# Patient Record
Sex: MALE | Race: WHITE | NOT HISPANIC OR LATINO | Employment: OTHER | URBAN - METROPOLITAN AREA
[De-identification: names, ages, dates, MRNs, and addresses within clinical notes are randomized per-mention and may not be internally consistent; named-entity substitution may affect disease eponyms.]

---

## 2019-11-18 ENCOUNTER — OFFICE VISIT (OUTPATIENT)
Dept: GASTROENTEROLOGY | Facility: CLINIC | Age: 59
End: 2019-11-18
Payer: COMMERCIAL

## 2019-11-18 ENCOUNTER — TELEPHONE (OUTPATIENT)
Dept: GASTROENTEROLOGY | Facility: CLINIC | Age: 59
End: 2019-11-18

## 2019-11-18 VITALS — HEIGHT: 72 IN | BODY MASS INDEX: 26.69 KG/M2 | WEIGHT: 197.06 LBS | RESPIRATION RATE: 18 BRPM

## 2019-11-18 DIAGNOSIS — Z86.59 HISTORY OF ANXIETY: ICD-10-CM

## 2019-11-18 DIAGNOSIS — Z87.19 HISTORY OF BARRETT'S ESOPHAGUS: ICD-10-CM

## 2019-11-18 DIAGNOSIS — R19.7 INTERMITTENT DIARRHEA: ICD-10-CM

## 2019-11-18 DIAGNOSIS — E04.1 THYROID NODULE: ICD-10-CM

## 2019-11-18 DIAGNOSIS — K31.89 DUODENAL NODULE: ICD-10-CM

## 2019-11-18 DIAGNOSIS — Z80.0 FAMILY HISTORY OF COLON CANCER: ICD-10-CM

## 2019-11-18 DIAGNOSIS — R10.11 RUQ ABDOMINAL PAIN: ICD-10-CM

## 2019-11-18 DIAGNOSIS — Z86.19 HISTORY OF CRYPTOSPORIDIOSIS: ICD-10-CM

## 2019-11-18 DIAGNOSIS — K27.9 PEPTIC ULCER: ICD-10-CM

## 2019-11-18 DIAGNOSIS — R19.8 ABNORMAL FINDINGS ON ESOPHAGOGASTRODUODENOSCOPY (EGD): Primary | ICD-10-CM

## 2019-11-18 DIAGNOSIS — R13.10 DYSPHAGIA, UNSPECIFIED TYPE: ICD-10-CM

## 2019-11-18 DIAGNOSIS — Z86.010 HISTORY OF COLON POLYPS: ICD-10-CM

## 2019-11-18 PROCEDURE — 99203 OFFICE O/P NEW LOW 30 MIN: CPT | Mod: PBBFAC,PO | Performed by: NURSE PRACTITIONER

## 2019-11-18 PROCEDURE — 99999 PR PBB SHADOW E&M-NEW PATIENT-LVL III: ICD-10-PCS | Mod: PBBFAC,,, | Performed by: NURSE PRACTITIONER

## 2019-11-18 PROCEDURE — 99999 PR PBB SHADOW E&M-NEW PATIENT-LVL III: CPT | Mod: PBBFAC,,, | Performed by: NURSE PRACTITIONER

## 2019-11-18 PROCEDURE — 99204 PR OFFICE/OUTPT VISIT, NEW, LEVL IV, 45-59 MIN: ICD-10-PCS | Mod: S$PBB,,, | Performed by: NURSE PRACTITIONER

## 2019-11-18 PROCEDURE — 99204 OFFICE O/P NEW MOD 45 MIN: CPT | Mod: S$PBB,,, | Performed by: NURSE PRACTITIONER

## 2019-11-18 NOTE — LETTER
November 18, 2019      [facility]  [id number]  Claims  [place] [id number]           [facility]  [address] [place] [address]  Phone: [phone]          Patient: Forrest Quiros   MR Number: [id number]   YOB: 1960   Date of Visit: 11/18/2019       Dear [facility]:    Thank you for referring Forrest Quiros to me for evaluation. Attached you will find relevant portions of my assessment and plan of care.    If you have questions, please do not hesitate to call me. I look forward to following Forrest Quiros along with you.    Sincerely,    Katrin Nelson, [facility]    Enclosure  CC:  No Recipients    If you would like to receive this communication electronically, please contact [email or web address] or [phone] to request more information on [employer] access.    For providers and/or their staff who would like to refer a patient to [facility], please contact us through our one-stop-shop provider referral line, Branden Arriaga, at [phone].    If you feel you have received this communication in error or would no longer like to receive these types of communications, please e-mail [email or web address]

## 2019-11-18 NOTE — PROGRESS NOTES
"Subjective:       Patient ID: Forrest Quiros is a 59 y.o. male Body mass index is 26.73 kg/m².    Chief Complaint: Establish Care (discuss endoscopy )    This patient is new to me.  Referring Provider: [facility]* for duodenal lesion, requesting EGD with side viewing scope & possible EUS & colonoscopy.     [facility] (limited [facility] received), summarized throughout progress note. Referral note showed: "patient with duodenal lesion unable to fully assess with forward viewing endoscope, requesting EGD with side viewing scope and possible EUS depending on findings". "Await path, however given polyps and prep, would repeat colonoscopy in 3-4 months with MAC." Patient is here with a family member, whom assisted with history. [facility] his [facility] in 1/2020. Prior testing was done for weight loss & diarrhea.  Blood work reviewed-see records for full results.  10/18/19 CT chest abdomen pelvis "impression: no findings identified to explain the patient's source of weight loss. A couple sub-centimeter thyroid nodules are present. Consider further evaluation with thyroid ultrasound. Coronary artery disease. Possible hemostasis clips in the cecum? Recommend clinical correlation."    GI Problem   The primary symptoms include weight loss, abdominal pain and diarrhea. Primary symptoms do not include fever, fatigue, nausea, vomiting, melena, hematemesis, hematochezia or dysuria.   The weight loss began more than 2 weeks ago. Weight loss amount: ~60 lbs from 1/2019-4/2019, stable lately, gained ~5 lbs since then; eating a loaf of bread and a 1 lb of turkey a day, eats cooked apples; or eats potatoes and chicken since these do not upset his stomach.   The abdominal pain began more than 2 days ago (started at least since 1/2019). The abdominal pain has been unchanged since its onset. The abdominal pain is " located in the RUQ (intermittent).   The diarrhea began more than 1 week ago (chronic since 1/2019, reports was hospitalized for a cryptosporidium infection at that time- treated with antibiotics; improving, intermittent, worse with greasy foods, red meats, & dairy; diarrhea not daily, controlled with dietary modifications overall). Daily occurrences: diarrhea once every 3 days; otherwise, bowel movements once daily of formed stool. Risk factors for illness producing diarrhea include recent antibiotic use.   The illness does not include chills, odynophagia or constipation. Significant associated medical issues include GERD (reports he was told after his EGD that he has Brown's esophagus; we did not receive biopsy results from EGD) and hemorrhoids. Associated medical issues do not include inflammatory bowel disease.     Review of Systems   Constitutional: Positive for weight loss. Negative for appetite change (good appetite), chills, fatigue and fever.   HENT: Positive for trouble swallowing (denies, reports he feels like he has trouble swallowing when he is anxious, otherwise denies trouble swallowing). Negative for sore throat.    Respiratory: Negative for cough, choking and shortness of breath.    Cardiovascular: Negative for chest pain.   Gastrointestinal: Positive for abdominal pain and diarrhea. Negative for anal bleeding, blood in stool, constipation, hematemesis, hematochezia, melena, nausea, rectal pain and vomiting.   Genitourinary: Negative for difficulty urinating, dysuria and flank pain.   Musculoskeletal:        Reports ~11/2018 was bit by a tick [facility] and was [facility] to the hospital for it and was placed on antibiotics for it; this has resolved   Neurological: Negative for weakness.   Psychiatric/Behavioral: The patient is nervous/anxious (reports at times when he is anxious he gets trouble swallowing).        Past Medical History:   Diagnosis Date    Brown's esophagus 10/2019     Colon polyp     Hemorrhoids     noted on 10/2019 colonoscopy    Hypertensive vascular disease     Myocardial ischemia     x2    PTSD (post-traumatic stress disorder)      Past Surgical History:   Procedure Laterality Date    APPENDECTOMY      COLONOSCOPY  10/16/2019    poor prep, colon polyps removed; 2 clips placed in ascending colon, biopsy in media: cecum polyps x3 tubular adenomas, ascending polyp x 2 tubular adenomas, descending polyp-hyperplastic, colon @ 40 cm- tubulovillous adenoma, colon @45cm polyp- tubular adenoma, sigmoid polyp hyperplastic, recto-sigmoid @23cm polyp-tubulovillous adenoma with small indeterminate high grade dysplasia, random WNL    UPPER GASTROINTESTINAL ENDOSCOPY  10/17/2019    copious food packing in stomach, aborted procedure, daniel appeared irregular    UPPER GASTROINTESTINAL ENDOSCOPY  10/24/2019    sent for scanning: normal d2, mild duodenitis bulb, duodenal sweep 1.5cm area of nodular, irregular mucosa, 2-3 mm clean based ulcer at the pylorus, gastritis, zline irregular; biopsy report not found     Family History   Problem Relation Age of Onset    Colon cancer Brother 58    Crohn's disease Neg Hx     Ulcerative colitis Neg Hx     Stomach cancer Neg Hx     Esophageal cancer Neg Hx      Wt Readings from Last 10 Encounters:   11/18/19 89.4 kg (197 lb 1.5 oz)     Reviewed medical records received from patient (endoscopy procedure reports) and [facility] ([facility] in media), summarized throughout note and in medical & surgical history (endoscopies, etc), copies made & given to nurse to be scanned into system.    Objective:      Physical Exam   Constitutional: He is oriented to person, place, and time. He appears well-developed and well-nourished. No distress.   HENT:   Mouth/Throat: Oropharynx is clear and moist and mucous membranes are normal. No oral lesions. No oropharyngeal exudate.   Eyes: Pupils are equal, round, and reactive to light. Conjunctivae are  normal. No scleral icterus.   Pulmonary/Chest: Effort normal and breath sounds normal. No respiratory distress. He has no wheezes.   Abdominal: Soft. Normal appearance and bowel sounds are normal. He exhibits no distension, no abdominal bruit and no mass. There is tenderness (mild) in the right upper quadrant. There is no rigidity, no rebound, no guarding, no tenderness at McBurney's point and negative Hermosillo's sign.   Neurological: He is alert and oriented to person, place, and time.   Skin: Skin is warm and dry. No rash noted. He is not diaphoretic. No erythema. No pallor.   Non-jaundiced   Psychiatric: He has a normal mood and affect. His behavior is normal. Judgment and thought content normal.   Nursing note and vitals reviewed.      Assessment:       1. Abnormal findings on esophagogastroduodenoscopy (EGD)    2. Duodenal nodule    3. Peptic ulcer    4. History of Brown's esophagus    5. History of colon polyps    6. Family history of colon cancer    7. Thyroid nodule    8. Intermittent diarrhea    9. History of cryptosporidiosis    10. RUQ abdominal pain    11. Dysphagia, unspecified type    12. History of anxiety        Plan:       Abnormal findings on esophagogastroduodenoscopy (EGD) & Duodenal nodule  -     Ambulatory Referral to Gastroenterology- Dr. NOREEN Davison or one of the other advanced endoscopists (message sent to their staff)  - schedule EGD with advance endoscopy team, discussed procedure with patient, including risks and benefits, patient verbalized understanding    Peptic ulcer  -     Ambulatory Referral to Gastroenterology  - schedule EGD with advance endoscopy team, discussed procedure with patient, including risks and benefits, patient verbalized understanding  - avoid/minimize use of NSAIDs- since they can cause GI upset, bleeding and/or ulcers. If NSAID must be taken, recommend take with food.    History of Brown's esophagus  -     Ambulatory Referral to Gastroenterology  - schedule EGD with  advance endoscopy team, discussed procedure with patient, including risks and benefits, patient verbalized understanding  - discussed diagnosis in detail with patient and the need for long term reflux medication and surveillance EGDs; patient verbalized understanding and agreed with management plan    History of colon polyps & Family history of colon cancer  - [facility] reports [facility] for consult in a few weeks for colonoscopy to be done in 1/2020.  - [facility] can perform colonoscopy if [facility]/patient desire; patient verbalized understanding and patient reports he will contact us if he would like to proceed with scheduling colonoscopy to be done by [facility].    Thyroid nodule  Recommend follow-up with Primary Care Provider/endocrinology for continued evaluation and management.    Intermittent diarrhea & History of cryptosporidiosis  -     Stool Exam-Ova,Cysts,Parasites; Future; Expected date: 11/18/2019  -     Fecal fat, qualitative; Future; Expected date: 11/18/2019  -     Giardia / Cryptosporidum, EIA; Future; Expected date: 11/18/2019  -     Occult blood x 1, stool; Future; Expected date: 11/18/2019  -     pH, stool; Future; Expected date: 11/18/2019  -     Rotavirus antigen, stool; Future; Expected date: 11/18/2019  -     WBC, Stool; Future; Expected date: 11/18/2019  -     Stool culture; Future; Expected date: 11/18/2019  -     Clostridium difficile EIA; Future; Expected date: 11/18/2019  -     Adenovirus Molecular Detection, PCR, Non-Blood Stool; Future; Expected date: 11/18/2019  - [facility] can perform colonoscopy if [facility]/patient desire; patient verbalized understanding and patient reports he will contact us if he would like to proceed with scheduling colonoscopy to be done by [facility].  - recommended OTC probiotic, such as Align or Culturelle, as directed  - avoid lactose  - avoid trigger foods    RUQ abdominal pain  -     US Abdomen Limited; Future; Expected date: 11/18/2019  -     Ambulatory Referral  to Gastroenterology  - schedule EGD with advance endoscopy team, discussed procedure with patient, including risks and benefits, patient verbalized understanding    Dysphagia, unspecified type  - schedule EGD with advance endoscopy team, discussed procedure with patient, including risks and benefits, patient verbalized understanding  - educated patient to eat smaller more frequent meals and to eat slowly and advised to eat a soft diet.  - possible UGI/esophagram/esophageal manometry if symptoms persist    History of anxiety  Recommend follow-up with Primary Care Provider/psych for continued evaluation and management.    Follow up in about 1 month (around 12/18/2019), or if symptoms worsen or fail to improve.      If no improvement in symptoms or symptoms worsen, call/follow-up at clinic or go to ER.

## 2019-11-18 NOTE — Clinical Note
Hello,This patient was referred [facility] by [facility]. [facility] is requesting EGD with side viewing scope and possible EUS depending on findings. [facility] does not have EUS. Can you please schedule patient for the requested test(s) to be done by the advanced endoscopy team?Thank you for your help,Katrin Nelson

## 2019-11-19 ENCOUNTER — TELEPHONE (OUTPATIENT)
Dept: ENDOSCOPY | Facility: HOSPITAL | Age: 59
End: 2019-11-19

## 2019-11-19 DIAGNOSIS — K31.89 DUODENAL NODULE: Primary | ICD-10-CM

## 2019-11-19 NOTE — TELEPHONE ENCOUNTER
MD Radha Jensen MA   Caller: Unspecified (Yesterday,  2:50 PM)             Need EGD/EUS for duodenal nodule.   Peter Koch MD      Please sign order

## 2019-11-25 ENCOUNTER — HOSPITAL ENCOUNTER (OUTPATIENT)
Dept: RADIOLOGY | Facility: HOSPITAL | Age: 59
Discharge: HOME | End: 2019-11-25
Attending: NURSE PRACTITIONER
Payer: COMMERCIAL

## 2019-11-25 ENCOUNTER — TELEPHONE (OUTPATIENT)
Dept: GASTROENTEROLOGY | Facility: CLINIC | Age: 59
End: 2019-11-25

## 2019-11-25 ENCOUNTER — TELEPHONE (OUTPATIENT)
Dept: ENDOSCOPY | Facility: HOSPITAL | Age: 59
End: 2019-11-25

## 2019-11-25 DIAGNOSIS — K80.20 CALCULUS OF GALLBLADDER WITHOUT CHOLECYSTITIS WITHOUT OBSTRUCTION: ICD-10-CM

## 2019-11-25 DIAGNOSIS — R10.11 RUQ ABDOMINAL PAIN: ICD-10-CM

## 2019-11-25 DIAGNOSIS — R10.11 RUQ ABDOMINAL PAIN: Primary | ICD-10-CM

## 2019-11-25 PROCEDURE — 76705 ECHO EXAM OF ABDOMEN: CPT | Mod: 26,,, | Performed by: RADIOLOGY

## 2019-11-25 PROCEDURE — 76705 US ABDOMEN LIMITED: ICD-10-PCS | Mod: 26,,, | Performed by: RADIOLOGY

## 2019-11-25 PROCEDURE — 76705 ECHO EXAM OF ABDOMEN: CPT | Mod: TC,PO

## 2019-11-25 NOTE — TELEPHONE ENCOUNTER
Spoke with pt wife and informed of results and recommendations per Lea Nelson NP. Pt wife verbalized understanding.

## 2019-11-25 NOTE — TELEPHONE ENCOUNTER
Spoke with patient and wife. EGD/EUS scheduled for 12/23 at 10a. Reviewed prep instructions. Mr and Mrs Lazaroer verbalized understanding.

## 2019-11-25 NOTE — TELEPHONE ENCOUNTER
Attempted to contact pt. Left message for pt to contact clinic for results. Call back number provided.

## 2019-11-25 NOTE — TELEPHONE ENCOUNTER
"Please call to inform & review the results with the patient- radiology report of the right upper abdominal ultrasound showed "Cholelithiasis without ultrasound findings of acute cholecystitis or biliary duct dilatation."  - recommend low fat diet  - gallstones can cause symptoms in some patients, while other patients with it can be asymptomatic; recommend continue recommendations as discussed during our visit and recommend seeing general surgery for further evaluation and management of gallstone (referral placed). [facility].  Otherwise unremarkable findings.    Continue with previous recommendations. If no improvement in symptoms or symptoms worsen, call/follow-up at clinic or go to ER.  Please release results to patient's mychart once you have discussed results and recommendations with patient.  Thanks,        "

## 2019-11-26 ENCOUNTER — TELEPHONE (OUTPATIENT)
Dept: ENDOSCOPY | Facility: HOSPITAL | Age: 59
End: 2019-11-26

## 2019-11-26 NOTE — TELEPHONE ENCOUNTER
Received request to schedule patient for EUS/EGD on 12/23/19 at 10:00am. Spoke with Patient's wife. Reviewed medical history and medications. Instructed on procedure and prep. Patient's wife verbalized understanding of instructions. Mailed copy of instructions to address in chart.    Pt had food in stomach with EGD at [facility]. Instructed on gastroparesis diet.

## 2019-11-27 RX ORDER — ALPRAZOLAM 0.25 MG/1
TABLET ORAL 2 TIMES DAILY PRN
COMMUNITY

## 2019-11-27 RX ORDER — FLUOXETINE 10 MG/1
10 CAPSULE ORAL DAILY
COMMUNITY

## 2019-11-27 RX ORDER — BUPROPION HYDROCHLORIDE 150 MG/1
150 TABLET, EXTENDED RELEASE ORAL 2 TIMES DAILY
COMMUNITY

## 2019-12-02 ENCOUNTER — TELEPHONE (OUTPATIENT)
Dept: GASTROENTEROLOGY | Facility: CLINIC | Age: 59
End: 2019-12-02

## 2019-12-02 NOTE — TELEPHONE ENCOUNTER
"Please call to inform & review the results with the patient- stool studies showed acidic stool; otherwise, negative/normal results. Acidic stool can indicate difficulty digesting certain dietary items. Recommend to avoid lactose products.  "Comment: C difficile Toxin, Antigen was cancelled on 11/26/2019 at 06:29 by AMW2; Formed stool specimen received for testing. Testing is only performed on liquid/semi-formed specimens. Called to ______________________________. 11/26/2019  06:28" If diarrhea persist, recommend completing C. Diff stool study.    Continue with previous recommendations. If no improvement in symptoms or symptoms worsen, call/follow-up at clinic or go to ER.  Please release results to patient's mychart once you have discussed results and recommendations with patient.  Thanks,        "

## 2019-12-05 ENCOUNTER — TELEPHONE (OUTPATIENT)
Dept: GASTROENTEROLOGY | Facility: CLINIC | Age: 59
End: 2019-12-05

## 2019-12-05 NOTE — TELEPHONE ENCOUNTER
Spoke with pt and informed of results and recommendations per Lea Nelson NP. Pt verbalized understanding.

## 2019-12-23 ENCOUNTER — ANESTHESIA (OUTPATIENT)
Dept: ENDOSCOPY | Facility: HOSPITAL | Age: 59
End: 2019-12-23
Payer: COMMERCIAL

## 2019-12-23 ENCOUNTER — HOSPITAL ENCOUNTER (OUTPATIENT)
Facility: HOSPITAL | Age: 59
Discharge: HOME | End: 2019-12-23
Attending: INTERNAL MEDICINE | Admitting: INTERNAL MEDICINE
Payer: COMMERCIAL

## 2019-12-23 ENCOUNTER — ANESTHESIA EVENT (OUTPATIENT)
Dept: ENDOSCOPY | Facility: HOSPITAL | Age: 59
End: 2019-12-23
Payer: COMMERCIAL

## 2019-12-23 VITALS
HEIGHT: 72 IN | HEART RATE: 48 BPM | WEIGHT: 199 LBS | SYSTOLIC BLOOD PRESSURE: 133 MMHG | BODY MASS INDEX: 26.95 KG/M2 | TEMPERATURE: 98 F | OXYGEN SATURATION: 100 % | RESPIRATION RATE: 16 BRPM | DIASTOLIC BLOOD PRESSURE: 72 MMHG

## 2019-12-23 DIAGNOSIS — K31.89 DUODENAL NODULE: Primary | ICD-10-CM

## 2019-12-23 PROCEDURE — 43259 EGD US EXAM DUODENUM/JEJUNUM: CPT | Mod: ,,, | Performed by: INTERNAL MEDICINE

## 2019-12-23 PROCEDURE — 27201012 HC FORCEPS, HOT/COLD, DISP: Performed by: INTERNAL MEDICINE

## 2019-12-23 PROCEDURE — 63600175 PHARM REV CODE 636 W HCPCS: Performed by: NURSE ANESTHETIST, CERTIFIED REGISTERED

## 2019-12-23 PROCEDURE — D9220A PRA ANESTHESIA: ICD-10-PCS | Mod: CRNA,,, | Performed by: NURSE ANESTHETIST, CERTIFIED REGISTERED

## 2019-12-23 PROCEDURE — D9220A PRA ANESTHESIA: ICD-10-PCS | Mod: ANES,,, | Performed by: ANESTHESIOLOGY

## 2019-12-23 PROCEDURE — 88305 TISSUE EXAM BY PATHOLOGIST: CPT | Mod: 59 | Performed by: PATHOLOGY

## 2019-12-23 PROCEDURE — 37000008 HC ANESTHESIA 1ST 15 MINUTES: Performed by: INTERNAL MEDICINE

## 2019-12-23 PROCEDURE — 25000003 PHARM REV CODE 250: Performed by: NURSE ANESTHETIST, CERTIFIED REGISTERED

## 2019-12-23 PROCEDURE — 88305 TISSUE EXAM BY PATHOLOGIST: CPT | Mod: 26,,, | Performed by: PATHOLOGY

## 2019-12-23 PROCEDURE — 37000009 HC ANESTHESIA EA ADD 15 MINS: Performed by: INTERNAL MEDICINE

## 2019-12-23 PROCEDURE — 43239 EGD BIOPSY SINGLE/MULTIPLE: CPT | Performed by: INTERNAL MEDICINE

## 2019-12-23 PROCEDURE — D9220A PRA ANESTHESIA: Mod: CRNA,,, | Performed by: NURSE ANESTHETIST, CERTIFIED REGISTERED

## 2019-12-23 PROCEDURE — 43259 EGD US EXAM DUODENUM/JEJUNUM: CPT | Performed by: INTERNAL MEDICINE

## 2019-12-23 PROCEDURE — 43239 EGD BIOPSY SINGLE/MULTIPLE: CPT | Mod: 59,,, | Performed by: INTERNAL MEDICINE

## 2019-12-23 PROCEDURE — 43259 PR ENDOSCOPIC ULTRASOUND EXAM: ICD-10-PCS | Mod: ,,, | Performed by: INTERNAL MEDICINE

## 2019-12-23 PROCEDURE — D9220A PRA ANESTHESIA: Mod: ANES,,, | Performed by: ANESTHESIOLOGY

## 2019-12-23 PROCEDURE — 63600175 PHARM REV CODE 636 W HCPCS: Performed by: INTERNAL MEDICINE

## 2019-12-23 PROCEDURE — 88305 TISSUE EXAM BY PATHOLOGIST: ICD-10-PCS | Mod: 26,,, | Performed by: PATHOLOGY

## 2019-12-23 PROCEDURE — 43239 PR EGD, FLEX, W/BIOPSY, SGL/MULTI: ICD-10-PCS | Mod: 59,,, | Performed by: INTERNAL MEDICINE

## 2019-12-23 RX ORDER — SODIUM CHLORIDE 0.9 % (FLUSH) 0.9 %
10 SYRINGE (ML) INJECTION
Status: DISCONTINUED | OUTPATIENT
Start: 2019-12-23 | End: 2019-12-23 | Stop reason: HOSPADM

## 2019-12-23 RX ORDER — PROPOFOL 10 MG/ML
VIAL (ML) INTRAVENOUS
Status: DISCONTINUED | OUTPATIENT
Start: 2019-12-23 | End: 2019-12-23

## 2019-12-23 RX ORDER — SODIUM CHLORIDE 9 MG/ML
INJECTION, SOLUTION INTRAVENOUS CONTINUOUS
Status: DISCONTINUED | OUTPATIENT
Start: 2019-12-23 | End: 2019-12-23 | Stop reason: HOSPADM

## 2019-12-23 RX ORDER — SODIUM CHLORIDE 0.9 % (FLUSH) 0.9 %
3 SYRINGE (ML) INJECTION
Status: DISCONTINUED | OUTPATIENT
Start: 2019-12-23 | End: 2019-12-23 | Stop reason: HOSPADM

## 2019-12-23 RX ORDER — LIDOCAINE HCL/PF 100 MG/5ML
SYRINGE (ML) INTRAVENOUS
Status: DISCONTINUED | OUTPATIENT
Start: 2019-12-23 | End: 2019-12-23

## 2019-12-23 RX ORDER — GLYCOPYRROLATE 0.2 MG/ML
INJECTION INTRAMUSCULAR; INTRAVENOUS
Status: DISCONTINUED | OUTPATIENT
Start: 2019-12-23 | End: 2019-12-23

## 2019-12-23 RX ORDER — PROPOFOL 10 MG/ML
VIAL (ML) INTRAVENOUS CONTINUOUS PRN
Status: DISCONTINUED | OUTPATIENT
Start: 2019-12-23 | End: 2019-12-23

## 2019-12-23 RX ADMIN — SODIUM CHLORIDE: 0.9 INJECTION, SOLUTION INTRAVENOUS at 09:12

## 2019-12-23 RX ADMIN — LIDOCAINE HYDROCHLORIDE 100 MG: 20 INJECTION, SOLUTION INTRAVENOUS at 09:12

## 2019-12-23 RX ADMIN — GLYCOPYRROLATE 0.2 MG: 0.2 INJECTION, SOLUTION INTRAMUSCULAR; INTRAVENOUS at 09:12

## 2019-12-23 RX ADMIN — PROPOFOL 40 MG: 10 INJECTION, EMULSION INTRAVENOUS at 10:12

## 2019-12-23 RX ADMIN — PROPOFOL 150 MCG/KG/MIN: 10 INJECTION, EMULSION INTRAVENOUS at 09:12

## 2019-12-23 RX ADMIN — PROPOFOL 80 MG: 10 INJECTION, EMULSION INTRAVENOUS at 09:12

## 2019-12-23 NOTE — PROGRESS NOTES
Patient c/o increasing pain on right side radiating to back. 4-5/10. Message left in endo dept to inform Dr Pee Calhoun.

## 2019-12-23 NOTE — PROGRESS NOTES
Dr Pee Calhoun at bedside and aware of right abdominal pain. Explaining findings to patient and wife. Okay to discharge.

## 2019-12-23 NOTE — ANESTHESIA POSTPROCEDURE EVALUATION
Anesthesia Post Evaluation    Patient: Forrest Quiros    Procedure(s) Performed: Procedure(s) (LRB):  EGD (ESOPHAGOGASTRODUODENOSCOPY) (N/A)  ULTRASOUND, UPPER GI TRACT, ENDOSCOPIC (N/A)    Final Anesthesia Type: general    Patient location during evaluation: [facility]  Patient participation: Yes- Able to Participate  Level of consciousness: awake and alert  Post-procedure vital signs: reviewed and stable  Pain management: adequate  Airway patency: patent    PONV status at discharge: No PONV  Anesthetic complications: no      Cardiovascular status: blood pressure returned to baseline  Respiratory status: unassisted  Hydration status: euvolemic  Follow-up not needed.          Vitals Value Taken Time   /72 12/23/2019 11:46 AM   Temp 36.9 °C (98.4 °F) 12/23/2019 11:45 AM   Pulse 45 12/23/2019 11:48 AM   Resp 16 12/23/2019 11:45 AM   SpO2 100 % 12/23/2019 11:48 AM   Vitals shown include unvalidated device data.      No case tracking events are documented in the log.      Pain/Chapis Score: Chapis Score: 10 (12/23/2019 11:55 AM)

## 2019-12-23 NOTE — TRANSFER OF CARE
Anesthesia Transfer of Care Note    Patient: Forrest Quiros    Procedure(s) Performed: Procedure(s) (LRB):  EGD (ESOPHAGOGASTRODUODENOSCOPY) (N/A)  ULTRASOUND, UPPER GI TRACT, ENDOSCOPIC (N/A)    Patient location: [facility]    Anesthesia Type: general    Transport from OR: Transported from OR on room air with adequate spontaneous ventilation    Post pain: adequate analgesia    Post assessment: no apparent anesthetic complications and tolerated procedure well    Post vital signs: stable    Level of consciousness: awake, alert and oriented    Nausea/Vomiting: no nausea/vomiting    Complications: none    Transfer of care protocol was followed      Last vitals:   Visit Vitals  BP (!) 107/57 (BP Location: Right arm)   Pulse (!) 55   Temp 36.7 °C (98.1 °F) (Temporal)   Resp 16   Ht 6' (1.829 m)   Wt 90.3 kg (199 lb)   SpO2 100%   BMI 26.99 kg/m²

## 2019-12-23 NOTE — ANESTHESIA PREPROCEDURE EVALUATION
12/23/2019  Forrest Quiros is a 59 y.o., male.    Anesthesia Evaluation    I have reviewed the Patient Summary Reports.     I have reviewed the Medications.     Review of Systems  Anesthesia Hx:  No problems with previous Anesthesia  History of prior surgery of interest to airway management or planning: Previous anesthesia: General   Social:  Smoker, Social Alcohol Use    Hematology/Oncology:  Hematology Normal   Oncology Normal     EENT/Dental:EENT/Dental Normal   Cardiovascular:   Exercise tolerance: poor Hypertension, well controlled    Pulmonary:  Pulmonary Normal    Renal/:  Renal/ Normal     Hepatic/GI:  Hepatic/GI Normal    Musculoskeletal:  Musculoskeletal Normal    Neurological:  Neurology Normal    Endocrine:  Endocrine Normal    Dermatological:  Skin Normal    Psych:   Psychiatric History          Physical Exam  General:  Well nourished    Airway/Jaw/Neck:  Airway Findings: Mouth Opening: Normal Tongue: Normal  General Airway Assessment: Adult  Mallampati: II  Jaw/Neck Findings:  Neck ROM: Normal ROM      Dental:  Dental Findings: In tact        Mental Status:  Mental Status Findings:  Cooperative, Alert and Oriented         Anesthesia Plan  Type of Anesthesia, risks & benefits discussed:  Anesthesia Type:  general  Patient's Preference: GA  Intra-op Monitoring Plan: standard ASA monitors  Intra-op Monitoring Plan Comments:   Post Op Pain Control Plan: multimodal analgesia  Post Op Pain Control Plan Comments:   Induction:   IV  Beta Blocker:  Patient is not currently on a Beta-Blocker (No further documentation required).       Informed Consent: Patient understands risks and agrees with Anesthesia plan.  Questions answered. Anesthesia consent signed with patient.  ASA Score: 3     Day of Surgery Review of History & Physical:  There are no significant changes.  H&P update referred to the provider.          Ready For Surgery From Anesthesia Perspective.

## 2019-12-23 NOTE — H&P
History & Physical - Short Stay  Gastroenterology      SUBJECTIVE:     Procedure: EGD and EUS    Chief Complaint/Indication for Procedure: duodenal nodule    History of Present Illness:  Patient is a 59 y.o. male with duodenal nodule coming for EGD and EUS.     PTA Medications   Medication Sig    ALPRAZolam (XANAX) 0.25 MG tablet Take by mouth 2 (two) times daily as needed for Anxiety.    buPROPion (WELLBUTRIN SR) 150 MG TBSR 12 hr tablet Take 150 mg by mouth 2 (two) times daily.    FLUoxetine 10 MG capsule Take 10 mg by mouth once daily.       Review of patient's allergies indicates:  No Known Allergies     Past Medical History:   Diagnosis Date    Brown's esophagus 10/2019    Colon polyp     Hemorrhoids     noted on 10/2019 colonoscopy    Hypertensive vascular disease     Myocardial ischemia     x2    PTSD (post-traumatic stress disorder)      Past Surgical History:   Procedure Laterality Date    APPENDECTOMY      COLONOSCOPY  10/16/2019    poor prep, colon polyps removed; 2 clips placed in ascending colon, biopsy in media: cecum polyps x3 tubular adenomas, ascending polyp x 2 tubular adenomas, descending polyp-hyperplastic, colon @ 40 cm- tubulovillous adenoma, colon @45cm polyp- tubular adenoma, sigmoid polyp hyperplastic, recto-sigmoid @23cm polyp-tubulovillous adenoma with small indeterminate high grade dysplasia, random WNL    UPPER GASTROINTESTINAL ENDOSCOPY  10/17/2019    copious food packing in stomach, aborted procedure, daniel appeared irregular    UPPER GASTROINTESTINAL ENDOSCOPY  10/24/2019    sent for scanning: normal d2, mild duodenitis bulb, duodenal sweep 1.5cm area of nodular, irregular mucosa, 2-3 mm clean based ulcer at the pylorus, gastritis, zline irregular; biopsy report not found     Family History   Problem Relation Age of Onset    Colon cancer Brother 58    Crohn's disease Neg Hx     Ulcerative colitis Neg Hx     Stomach cancer Neg Hx     Esophageal cancer Neg Hx       Social History     Tobacco Use    Smoking status: Former Smoker    Smokeless tobacco: Never Used   Substance Use Topics    Alcohol use: Yes     Alcohol/week: 7.0 - 9.0 standard drinks     Types: 2 Cans of beer, 5 - 7 Shots of liquor per week    Drug use: Not on file            OBJECTIVE:     Vital Signs (Most Recent)  Temp: 98.8 °F (37.1 °C) (12/23/19 0851)  Pulse: (!) 50 (12/23/19 0851)  Resp: 16 (12/23/19 0851)  BP: 136/78 (12/23/19 0851)  SpO2: 98 % (12/23/19 0851)       ASSESSMENT/PLAN:     Patient is a 59 y.o. male with duodenal nodule coming for EGD and EUS.     Plan: EGD and EUS    Anesthesia Plan: Moderate Sedation    ASA Grade: ASA 2 - Patient with mild systemic disease with no functional limitations

## 2019-12-23 NOTE — [employer] PATIENT INSTRUCTIONS
Discharge Summary/Instructions after an Endoscopic Procedure  Patient Name: Forrest Quiros  Patient MRN: [id number]  Patient YOB: 1960 Monday, December 23, 2019  Neo Calhoun MD  RESTRICTIONS:  During your procedure today, you received medications for sedation.  These   medications may affect your judgment, balance and coordination.  Therefore,   for 24 hours, you have the following restrictions:   - DO NOT drive a car, operate machinery, make legal/financial decisions,   sign important papers or drink alcohol.    ACTIVITY:  Today: no heavy lifting, straining or running due to procedural   sedation/anesthesia.  The following day: return to full activity including work.  DIET:  Eat and drink normally unless instructed otherwise.     TREATMENT FOR COMMON SIDE EFFECTS:  - Mild abdominal pain, nausea, belching, bloating or excessive gas:  rest,   eat lightly and use a heating pad.  - Sore Throat: treat with throat lozenges and/or gargle with warm salt   water.  - Because air was used during the procedure, expelling large amounts of air   from your rectum or belching is normal.  - If a bowel prep was taken, you may not have a bowel movement for 1-3 days.    This is normal.  SYMPTOMS TO WATCH FOR AND REPORT TO YOUR PHYSICIAN:  1. Abdominal pain or bloating, other than gas cramps.  2. Chest pain.  3. Back pain.  4. Signs of infection such as: chills or fever occurring within 24 hours   after the procedure.  5. Rectal bleeding, which would show as bright red, maroon, or black stools.   (A tablespoon of blood from the rectum is not serious, especially if   hemorrhoids are present.)  6. Vomiting.  7. Weakness or dizziness.  GO DIRECTLY TO THE NEAREST EMERGENCY ROOM IF YOU HAVE ANY OF THE FOLLOWING:      Difficulty breathing              Chills and/or fever over 101 F   Persistent vomiting and/or vomiting blood   Severe abdominal pain   Severe chest pain   Black, tarry stools   Bleeding- more than one  tablespoon   Any other symptom or condition that you feel may need urgent attention  Your doctor recommends these additional instructions:  If any biopsies were taken, your doctors clinic will contact you in 1 to 2   weeks with any results.  - Discharge patient to home.   - Resume previous diet.   - Continue present medications.   - Return to referring physician.   - Await path results.   - Patient has a contact number available for emergencies.  The signs and   symptoms of potential delayed complications were discussed with the   patient.  Return to normal activities tomorrow.  Written discharge   instructions were provided to the patient.  For questions, problems or results please call your physician - Neo Calhoun MD at Work:  [phone].  [facility], EMERGENCY ROOM PHONE NUMBER: [phone]  IF A COMPLICATION OR EMERGENCY SITUATION ARISES AND YOU ARE UNABLE TO REACH   YOUR PHYSICIAN - GO DIRECTLY TO THE EMERGENCY ROOM.  Neo Calhoun MD  12/23/2019 11:19:56 AM  This report has been verified and signed electronically.  [employer]

## 2020-01-07 LAB
FINAL PATHOLOGIC DIAGNOSIS: NORMAL
GROSS: NORMAL

## 2020-01-10 ENCOUNTER — TELEPHONE (OUTPATIENT)
Dept: ENDOSCOPY | Facility: HOSPITAL | Age: 60
End: 2020-01-10

## 2020-01-10 NOTE — TELEPHONE ENCOUNTER
----- Message from Neo Calhoun MD sent at 1/9/2020  1:17 PM CST -----  No cancer on any of the biopsies. Repeat EGD for quiroz's surveillance in 3 years. Can be done by referring GI.